# Patient Record
Sex: MALE | Race: WHITE
[De-identification: names, ages, dates, MRNs, and addresses within clinical notes are randomized per-mention and may not be internally consistent; named-entity substitution may affect disease eponyms.]

---

## 2017-03-23 ENCOUNTER — HOSPITAL ENCOUNTER (OUTPATIENT)
Dept: HOSPITAL 35 - PAIN | Age: 72
End: 2017-03-23
Attending: ANESTHESIOLOGY
Payer: COMMERCIAL

## 2017-03-23 VITALS — DIASTOLIC BLOOD PRESSURE: 74 MMHG | SYSTOLIC BLOOD PRESSURE: 114 MMHG

## 2017-03-23 VITALS — HEIGHT: 70.98 IN | BODY MASS INDEX: 30.66 KG/M2 | WEIGHT: 219 LBS

## 2017-03-23 DIAGNOSIS — I10: ICD-10-CM

## 2017-03-23 DIAGNOSIS — M06.80: ICD-10-CM

## 2017-03-23 DIAGNOSIS — M19.011: Primary | ICD-10-CM

## 2021-12-16 ENCOUNTER — HOSPITAL ENCOUNTER (OUTPATIENT)
Dept: HOSPITAL 35 - PAIN | Age: 76
Discharge: HOME | End: 2021-12-16
Attending: ANESTHESIOLOGY
Payer: COMMERCIAL

## 2021-12-16 VITALS — HEIGHT: 70 IN | BODY MASS INDEX: 29.35 KG/M2 | WEIGHT: 205 LBS

## 2021-12-16 VITALS — DIASTOLIC BLOOD PRESSURE: 77 MMHG | SYSTOLIC BLOOD PRESSURE: 111 MMHG

## 2021-12-16 DIAGNOSIS — M25.511: ICD-10-CM

## 2021-12-16 DIAGNOSIS — Z79.899: ICD-10-CM

## 2021-12-16 DIAGNOSIS — M25.512: ICD-10-CM

## 2021-12-16 DIAGNOSIS — M12.811: ICD-10-CM

## 2021-12-16 DIAGNOSIS — E07.9: ICD-10-CM

## 2021-12-16 DIAGNOSIS — M12.812: Primary | ICD-10-CM

## 2021-12-16 DIAGNOSIS — Z98.890: ICD-10-CM

## 2021-12-16 DIAGNOSIS — I10: ICD-10-CM

## 2021-12-16 NOTE — NUR
Pain Clinic Assessment:
 
1. History of Osteoarthritis:
Not Applicable
   History of Rheumatoid Arthritis:
Not Applicable
 
2. Height: 5 ft. 10 in. 177.8 cm.
   Weight: 205.0 lb.  oz. 92.988 kg.
   Patient's BMI: 29.4
 
3. Vital Signs:
   BP: 111/77 Pulse: 60 Resp: 16
   Temp:  02 Sat: 100 ECG Mon:
 
4. Pain Intensity: 7
 
5. Fall Risk:
   Dizziness: N  Needs help standing or walking: N
   Fallen in the last 3 months: N
   Fall risk comments:
 
 
6. Patient on Blood Thinner: None
 
7. History of Hypertension: Y
 
8. Opioid Therapy greater than 6 weeks: N
   Opiate Contract Signed:
 
9. Risk Assessment Tool Provided: low-0
 
10. Functional Assessment Tool: 65/70
 
11. Recreational Drug Use: Never Drug Type:
    Tobacco Use: Never Smoker Tobacco Type:
       Amount or Packs/day:  How Many Years:
    Alcohol Use: Yes  Frequency: Daily Quant: 1